# Patient Record
Sex: FEMALE | Race: WHITE
[De-identification: names, ages, dates, MRNs, and addresses within clinical notes are randomized per-mention and may not be internally consistent; named-entity substitution may affect disease eponyms.]

---

## 2017-11-18 ENCOUNTER — HOSPITAL ENCOUNTER (INPATIENT)
Dept: HOSPITAL 25 - ED | Age: 57
LOS: 2 days | Discharge: HOME | DRG: 140 | End: 2017-11-20
Attending: HOSPITALIST | Admitting: INTERNAL MEDICINE
Payer: COMMERCIAL

## 2017-11-18 DIAGNOSIS — Z80.1: ICD-10-CM

## 2017-11-18 DIAGNOSIS — Z88.1: ICD-10-CM

## 2017-11-18 DIAGNOSIS — Z87.01: ICD-10-CM

## 2017-11-18 DIAGNOSIS — G47.33: ICD-10-CM

## 2017-11-18 DIAGNOSIS — E66.01: ICD-10-CM

## 2017-11-18 DIAGNOSIS — G89.29: ICD-10-CM

## 2017-11-18 DIAGNOSIS — Z85.42: ICD-10-CM

## 2017-11-18 DIAGNOSIS — M19.90: ICD-10-CM

## 2017-11-18 DIAGNOSIS — Z87.440: ICD-10-CM

## 2017-11-18 DIAGNOSIS — M54.9: ICD-10-CM

## 2017-11-18 DIAGNOSIS — I10: ICD-10-CM

## 2017-11-18 DIAGNOSIS — F41.9: ICD-10-CM

## 2017-11-18 DIAGNOSIS — G43.909: ICD-10-CM

## 2017-11-18 DIAGNOSIS — Z79.84: ICD-10-CM

## 2017-11-18 DIAGNOSIS — E11.9: ICD-10-CM

## 2017-11-18 DIAGNOSIS — Z90.49: ICD-10-CM

## 2017-11-18 DIAGNOSIS — J44.1: Primary | ICD-10-CM

## 2017-11-18 DIAGNOSIS — E78.5: ICD-10-CM

## 2017-11-18 DIAGNOSIS — E03.9: ICD-10-CM

## 2017-11-18 DIAGNOSIS — Z87.891: ICD-10-CM

## 2017-11-18 DIAGNOSIS — Z90.710: ICD-10-CM

## 2017-11-18 LAB
ALBUMIN SERPL BCG-MCNC: 3.9 G/DL (ref 3.2–5.2)
ALP SERPL-CCNC: 112 U/L (ref 34–104)
ALT SERPL W P-5'-P-CCNC: 19 U/L (ref 7–52)
ANION GAP SERPL CALC-SCNC: 7 MMOL/L (ref 2–11)
AST SERPL-CCNC: 14 U/L (ref 13–39)
BUN SERPL-MCNC: 18 MG/DL (ref 6–24)
BUN/CREAT SERPL: 23.1 (ref 8–20)
CALCIUM SERPL-MCNC: 9.2 MG/DL (ref 8.6–10.3)
CHLORIDE SERPL-SCNC: 100 MMOL/L (ref 101–111)
GLOBULIN SER CALC-MCNC: 3.3 G/DL (ref 2–4)
GLUCOSE SERPL-MCNC: 131 MG/DL (ref 70–100)
HCO3 SERPL-SCNC: 28 MMOL/L (ref 22–32)
HCT VFR BLD AUTO: 40 % (ref 35–47)
HGB BLD-MCNC: 13.7 G/DL (ref 12–16)
MCH RBC QN AUTO: 30 PG (ref 27–31)
MCHC RBC AUTO-ENTMCNC: 34 G/DL (ref 31–36)
MCV RBC AUTO: 89 FL (ref 80–97)
POTASSIUM SERPL-SCNC: 4.1 MMOL/L (ref 3.5–5)
PROT SERPL-MCNC: 7.2 G/DL (ref 6.4–8.9)
RBC # BLD AUTO: 4.51 10^6/UL (ref 4–5.4)
SODIUM SERPL-SCNC: 135 MMOL/L (ref 133–145)
TROPONIN I SERPL-MCNC: 0 NG/ML (ref ?–0.04)
WBC # BLD AUTO: 8.4 10^3/UL (ref 3.5–10.8)

## 2017-11-18 PROCEDURE — 94760 N-INVAS EAR/PLS OXIMETRY 1: CPT

## 2017-11-18 PROCEDURE — 85025 COMPLETE CBC W/AUTO DIFF WBC: CPT

## 2017-11-18 PROCEDURE — 87502 INFLUENZA DNA AMP PROBE: CPT

## 2017-11-18 PROCEDURE — 71010: CPT

## 2017-11-18 PROCEDURE — 87040 BLOOD CULTURE FOR BACTERIA: CPT

## 2017-11-18 PROCEDURE — 84484 ASSAY OF TROPONIN QUANT: CPT

## 2017-11-18 PROCEDURE — 93005 ELECTROCARDIOGRAM TRACING: CPT

## 2017-11-18 PROCEDURE — 80048 BASIC METABOLIC PNL TOTAL CA: CPT

## 2017-11-18 PROCEDURE — 87899 AGENT NOS ASSAY W/OPTIC: CPT

## 2017-11-18 PROCEDURE — 80053 COMPREHEN METABOLIC PANEL: CPT

## 2017-11-18 PROCEDURE — 83880 ASSAY OF NATRIURETIC PEPTIDE: CPT

## 2017-11-18 PROCEDURE — 83605 ASSAY OF LACTIC ACID: CPT

## 2017-11-18 PROCEDURE — 85730 THROMBOPLASTIN TIME PARTIAL: CPT

## 2017-11-18 PROCEDURE — 36415 COLL VENOUS BLD VENIPUNCTURE: CPT

## 2017-11-18 PROCEDURE — 87070 CULTURE OTHR SPECIMN AEROBIC: CPT

## 2017-11-18 PROCEDURE — 94660 CPAP INITIATION&MGMT: CPT

## 2017-11-18 PROCEDURE — 87205 SMEAR GRAM STAIN: CPT

## 2017-11-18 PROCEDURE — 94640 AIRWAY INHALATION TREATMENT: CPT

## 2017-11-18 PROCEDURE — 85610 PROTHROMBIN TIME: CPT

## 2017-11-18 RX ADMIN — HEPARIN SODIUM SCH UNITS: 5000 INJECTION INTRAVENOUS; SUBCUTANEOUS at 20:11

## 2017-11-18 RX ADMIN — PREDNISONE SCH MG: 20 TABLET ORAL at 19:23

## 2017-11-18 RX ADMIN — IPRATROPIUM BROMIDE AND ALBUTEROL SULFATE SCH NEB: .5; 3 SOLUTION RESPIRATORY (INHALATION) at 19:47

## 2017-11-18 RX ADMIN — IPRATROPIUM BROMIDE AND ALBUTEROL SULFATE SCH NEB: .5; 3 SOLUTION RESPIRATORY (INHALATION) at 23:51

## 2017-11-18 RX ADMIN — AZITHROMYCIN SCH MLS/HR: 500 INJECTION, POWDER, LYOPHILIZED, FOR SOLUTION INTRAVENOUS at 19:24

## 2017-11-18 RX ADMIN — ATORVASTATIN CALCIUM SCH: 20 TABLET, FILM COATED ORAL at 19:24

## 2017-11-18 RX ADMIN — MOMETASONE FUROATE AND FORMOTEROL FUMARATE DIHYDRATE SCH PUFF: 200; 5 AEROSOL RESPIRATORY (INHALATION) at 19:47

## 2017-11-18 RX ADMIN — CEFTRIAXONE SODIUM SCH MLS/HR: 1 INJECTION, POWDER, FOR SOLUTION INTRAMUSCULAR; INTRAVENOUS at 21:10

## 2017-11-18 NOTE — RAD
INDICATION: Shortness of breath.



COMPARISON: Most recent comparison chest x-rays dated May 28, 2016

 

TECHNIQUE: Single AP portable view of the chest was obtained.



FINDINGS: 



Image quality is compromised due to the relative inferiority of a portable chest x-ray.



There is a mild degree of cardiomegaly similar in appearance to the prior chest x-ray. The

heart and mediastinum otherwise are normal in contour and morphology.



There is faint density overlying the bilateral lungs. The pulmonary vasculature appears

minimally engorged and indistinct. There is slight bilateral costophrenic angle blunting.



Visualized bones are normal for the patient's age.



IMPRESSION:  In the correct clinical setting chest x-ray findings are most consistent with

exacerbation of congestive heart failure.

## 2017-11-19 LAB
ANION GAP SERPL CALC-SCNC: 8 MMOL/L (ref 2–11)
BUN SERPL-MCNC: 15 MG/DL (ref 6–24)
BUN/CREAT SERPL: 20.8 (ref 8–20)
CALCIUM SERPL-MCNC: 9.5 MG/DL (ref 8.6–10.3)
CHLORIDE SERPL-SCNC: 101 MMOL/L (ref 101–111)
GLUCOSE SERPL-MCNC: 273 MG/DL (ref 70–100)
HCO3 SERPL-SCNC: 26 MMOL/L (ref 22–32)
HCT VFR BLD AUTO: 41 % (ref 35–47)
HGB BLD-MCNC: 13.8 G/DL (ref 12–16)
MCH RBC QN AUTO: 30 PG (ref 27–31)
MCHC RBC AUTO-ENTMCNC: 34 G/DL (ref 31–36)
MCV RBC AUTO: 90 FL (ref 80–97)
POTASSIUM SERPL-SCNC: 4.4 MMOL/L (ref 3.5–5)
RBC # BLD AUTO: 4.54 10^6/UL (ref 4–5.4)
SODIUM SERPL-SCNC: 135 MMOL/L (ref 133–145)
WBC # BLD AUTO: 5 10^3/UL (ref 3.5–10.8)

## 2017-11-19 PROCEDURE — 5A09357 ASSISTANCE WITH RESPIRATORY VENTILATION, LESS THAN 24 CONSECUTIVE HOURS, CONTINUOUS POSITIVE AIRWAY PRESSURE: ICD-10-PCS | Performed by: HOSPITALIST

## 2017-11-19 RX ADMIN — DILTIAZEM HYDROCHLORIDE SCH MG: 240 CAPSULE, COATED, EXTENDED RELEASE ORAL at 07:54

## 2017-11-19 RX ADMIN — LEVOTHYROXINE SODIUM SCH MCG: 175 TABLET ORAL at 07:14

## 2017-11-19 RX ADMIN — INSULIN LISPRO SCH UNITS: 100 INJECTION, SOLUTION INTRAVENOUS; SUBCUTANEOUS at 07:53

## 2017-11-19 RX ADMIN — INSULIN LISPRO SCH UNITS: 100 INJECTION, SOLUTION INTRAVENOUS; SUBCUTANEOUS at 16:24

## 2017-11-19 RX ADMIN — IPRATROPIUM BROMIDE AND ALBUTEROL SULFATE SCH: .5; 3 SOLUTION RESPIRATORY (INHALATION) at 03:16

## 2017-11-19 RX ADMIN — IPRATROPIUM BROMIDE AND ALBUTEROL SULFATE SCH NEB: .5; 3 SOLUTION RESPIRATORY (INHALATION) at 11:30

## 2017-11-19 RX ADMIN — CHOLESTYRAMINE SCH GM: 4 POWDER, FOR SUSPENSION ORAL at 06:15

## 2017-11-19 RX ADMIN — PREDNISONE SCH MG: 20 TABLET ORAL at 07:54

## 2017-11-19 RX ADMIN — IPRATROPIUM BROMIDE AND ALBUTEROL SULFATE SCH NEB: .5; 3 SOLUTION RESPIRATORY (INHALATION) at 16:03

## 2017-11-19 RX ADMIN — CEFTRIAXONE SODIUM SCH MLS/HR: 1 INJECTION, POWDER, FOR SOLUTION INTRAMUSCULAR; INTRAVENOUS at 21:06

## 2017-11-19 RX ADMIN — AZITHROMYCIN SCH MLS/HR: 500 INJECTION, POWDER, LYOPHILIZED, FOR SOLUTION INTRAVENOUS at 16:25

## 2017-11-19 RX ADMIN — MOMETASONE FUROATE AND FORMOTEROL FUMARATE DIHYDRATE SCH PUFF: 200; 5 AEROSOL RESPIRATORY (INHALATION) at 08:32

## 2017-11-19 RX ADMIN — HEPARIN SODIUM SCH: 5000 INJECTION INTRAVENOUS; SUBCUTANEOUS at 12:56

## 2017-11-19 RX ADMIN — IPRATROPIUM BROMIDE AND ALBUTEROL SULFATE SCH NEB: .5; 3 SOLUTION RESPIRATORY (INHALATION) at 23:40

## 2017-11-19 RX ADMIN — ATORVASTATIN CALCIUM SCH MG: 20 TABLET, FILM COATED ORAL at 16:24

## 2017-11-19 RX ADMIN — INSULIN LISPRO SCH UNITS: 100 INJECTION, SOLUTION INTRAVENOUS; SUBCUTANEOUS at 11:41

## 2017-11-19 RX ADMIN — IPRATROPIUM BROMIDE AND ALBUTEROL SULFATE SCH NEB: .5; 3 SOLUTION RESPIRATORY (INHALATION) at 08:30

## 2017-11-19 RX ADMIN — IPRATROPIUM BROMIDE AND ALBUTEROL SULFATE SCH NEB: .5; 3 SOLUTION RESPIRATORY (INHALATION) at 19:25

## 2017-11-19 RX ADMIN — MOMETASONE FUROATE AND FORMOTEROL FUMARATE DIHYDRATE SCH PUFF: 200; 5 AEROSOL RESPIRATORY (INHALATION) at 19:26

## 2017-11-19 RX ADMIN — HEPARIN SODIUM SCH UNITS: 5000 INJECTION INTRAVENOUS; SUBCUTANEOUS at 06:28

## 2017-11-19 RX ADMIN — HEPARIN SODIUM SCH UNITS: 5000 INJECTION INTRAVENOUS; SUBCUTANEOUS at 21:10

## 2017-11-19 NOTE — HP
CC:  Dr. Sushma Hameed *

 

HISTORY AND PHYSICAL:

 

DATE OF ADMISSION:  17

 

PRIMARY CARE PROVIDER:  Dr. Sushma Hameed.

 

ATTENDING PHYSICIAN WHILE IN THE HOSPITAL:  Rajesh Dyson MD * (report 
dictated by Octaviano Solo NP)

 

CHIEF COMPLAINT:

1.  Cough.

2.  Sore throat.

3.  Rhinorrhea.

4.  Shortness of breath.

 

HISTORY OF PRESENT ILLNESS:  Ms. Vazquez is a 57-year-old female patient 
carrying a history of hypothyroidism, chronic back pain,history of pneumonia in 
the past, obesity, ADÁN, COPD, uterine cancer, hyperlipidemia, diabetes, 
hypertension and migraine.  She comes in today stating the last couple of days 
she has been feeling pretty run down.  She has been having difficulty with 
breathing, having a sore throat.  She has progressively become more congested.  
She has had runny nose.  She had been trying to take Mucinex,  unfortunately, 
she ran out of her inhalers.  She tried to work last night, but she could not.  
She went home early from work.  She was lying down in bed.  She just woke up 
from sleep, was really short of breath, was getting more short of breath with 
any type of movement or minimal exertion. She denied having any documented 
fevers or chills and no recent sick contacts that she is aware of.  She does 
state that interestingly she recently had her flu shot. She was concerned 
because of the breathing, she decided to come into the ER today. She says she 
has been having a cough that has been productive.  She is swallowing the sputum
, she is not spitting it out.  She does not know what color it is.  She denied 
having any chest pain with the exception it hurts when she coughs and she 
denied having any nausea, vomiting or diarrhea or any abdominal discomfort.  
She was evaluated here in the ED.  She was given several rounds of nebulizer, 
but despite this she just was not improving.  So, we were asked to evaluate for 
admission.

 

PAST MEDICAL HISTORY:  Significant for:

1.  Hypothyroidism.

2.  Recurrent pneumonia in the past.

3.  Back pain.

4.  Obesity.

5.  ADÁN.

6.  COPD.

7.  Uterine cancer.

8.  Hypertension.

9.  Hyperlipidemia.

10.  Diabetes.

11.  Migraine.

 

PAST SURGICAL HISTORY:

1.  She has had a hernia repair.

2.  D and Cs.

3.  Laparoscopic cholecystectomy.

4.   section x3.

 

MEDICATIONS:  The home meds according to [facility] list and we need that 
clarified because I am not sure what inhaler she is supposed to be taking.  She 
said she has run out and has not been on them for some time, but the most 
recent meds that I have from [facility] include:

 

1.  Diltiazem  mg p.o. daily.

2.  Albuterol 2 puffs inhaled every 6 hours as needed.

3.  Synthroid 175 mcg daily.

4.  Questran 1 packet daily p.o.

5.  Januvia 25 mg daily.

6.  Glucophage 850 mg p.o. b.i.d.

7.  Lipitor 20 mg daily.

 

ALLERGIES TO MEDICATIONS:  Include LEVAQUIN.

 

FAMILY HISTORY:  Mother had a history of a lung cancer.  Father's history was 
reviewed and essentially unremarkable.  She denied her father having heart 
disease, strokes, or cancers.

 

SOCIAL HISTORY:  She is a former smoker, quit 20 years ago.  Does not drink 
alcohol.  Surrogate decision maker is her daughter.

 

REVIEW OF SYSTEMS:  There is no documented fever.  She denied having any 
significant weight change.  There was no double vision.  There is no ear 
discharge. There was rhinorrhea.  There was sore throat.  There was no thyroid 
enlargement. She does admit to having chest per my HPI.  There is dyspnea on 
exertion.  No orthopnea.  No nocturnal dyspnea.  No abdominal pain.  No nausea, 
no vomiting, no dysuria, no frequency.  There was no seizure, no loss of 
consciousness, no pruritus, and no skin ulcerations.  Review of 14 systems 
completed, all others are negative.

 

                               PHYSICAL EXAMINATION

 

GENERAL:  At this time, Ms. Vazquez is a 57-year-old female patient.  She is 
sitting in the ER stretcher.  She does not appear to be in any acute distress.

 

VITAL SIGNS:  Blood pressure 166/70, yesterday pulse 113 and is now 90, 
respirations were 22, O2 sat 98%, and temperature 99.2.

 

HEENT:  Head atraumatic.  Eyes, EOMs intact.  Sclerae anicteric.  Throat, oral 
mucosa appears to be dry.  I did not appreciate any erythema to her throat and 
she again was noted to be hoarse.

 

NECK:  Supple.

 

LUNGS:  She had wheezing noted throughout bilaterally.  Equal diaphragmatic 
expansion.

 

HEART:  Heart sounds S1 and S2.  No murmurs, rubs, or gallops.

 

ABDOMEN:  Soft.  It was flat, nontender.  Bowel sounds present.

 

EXTREMITIES:  Pulses were 2+ throughout.  She is moving all 4 extremities.  5/5 
strength.

 

NEUROLOGIC:  She is awake, alert, and oriented x3.  Speech is clear.  No gross 
focal deficits.

 

SKIN:  Intact.

 

 DIAGNOSTIC STUDIES/LABORATORY DATA:  WBC is 8.4, RBC of 4.51, hemoglobin 13.7, 
hematocrit 40, platelet count 278.  Sodium 135, potassium 4.1, chloride 100, 
bicarb 28, BUN 18, creatinine 0.78, glucose 131, lactate 1.6, calcium 9.2.  
Total bilirubin 1.0, AST 14, ALT 19, alk phos 112.  Troponin 0.  BNP 19.  
Albumin of 3.9. Serology negative for flu.

 

She did have a chest x-ray today.  When I reviewed it, I did not appreciate any 
acute infiltrates.  I reviewed with her previous chest x-ray, to me it appeared 
to be similar.  I did not appreciate any pulmonary edema.  She did have an EKG 
obtained today as well.  Today his EKG is showing a normal sinus rhythm with a 
rate of 94.  No ST elevations or T wave inversions were noted.  Old medical 
records were reviewed.

 

ASSESSMENT AND PLAN:  Ms. Vazquez is 57-year-old female patient coming into the 
ER today with upper respiratory complaints.  The hospitalist service was asked 
to evaluate for admission.  She was not responding to therapy here in the ED.  
She will be admitted under inpatient status for:

 

1.  Chronic obstructive pulmonary disease exacerbation.  At this point, I will 
go ahead and put her on standing neb steroids, inhaled steroids in the form of 
Dulera. At one point, she was on Advair, but she is not taking this anymore.  
She said she ran out.  I will go ahead and get legionella antigen, Strep pneumo 
antigen and I am going to put her on antibiotics as well and we will continue 
to follow her closely. I will order flutter valve as well and try to get a 
sputum culture if possible.

2.  Hypothyroidism.  Continue her Synthroid.

3.  Hyperlipidemia.  Continue her statin and Questran therapy.

4.  Hypertension.  Continue the Cartia.

5.  Obesity and chronic back pain.  Follow up with her primary.  None at this 
point.

6.  Obstructive sleep apnea.  She actually told me she is on BiPAP, we will 
continue.

7.  History of uterine cancer.  Again, follow up with primary.

8.  Migraines.  P.r.n. Tylenol as available.

9.  DVT prophylaxis:  She is high risk, placed on subcu.

10.  Code status:  Full code.

11.  Fluids, electrolytes, and nutrition:  She can have a consistent carb diet.

 

TIME SPENT:  On this admission was approximately 60 minutes, greater than half 
the time spent face-to-face with the patient obtaining my history and physical.
  Other half time spent going over the plan of care with the patient, 
implementing the plan of care.  I discussed the plan of care with my attending 
Dr. Dyson, he is in agreement.

 

 ____________________________________ OCTAVIANO SOLO, ALEXIS

 

[id number] #: [id number]

MTDCAROLYN

## 2017-11-19 NOTE — PN
Subjective


Date of Service: 11/19/17


Interval History: 





Ms. Vazquez reports that she feels better than on arrival but that she remains 

quite dyspneic on exertion.  She denies chest pain, nausea, or abdominal pain.








Objective


Active Medications: 





Acetaminophen (Tylenol Tab*)  650 mg PO Q4H PRN


Albuterol (Ventolin 2.5 Mg/3 Ml Neb.Sol*)  2.5 mg INH Q2H PRN


Albuterol/Ipratropium (Duoneb (Albuterol 2.5 Mg/Ipratropium 0.5 Mg))  1 neb INH 

Q4H MAKENNA


Atorvastatin Calcium (Lipitor*)  20 mg PO 1700 MAKENNA


Cholestyramine Resin (Questran*)  4 gm PO 0400 MAKENNA


Dextrose (D50w Syringe 50 Ml*)  12.5 gm IV PUSH .FOR FS < 60 - SS PRN


Diltiazem HCl (Cardizem Cd Cap*)  120 mg PO DAILY MAKENNA


Heparin Sodium (Porcine) (Heparin Vial(*))  5,000 units SUBCUT Q8HR MAKENNA


Ceftriaxone Sodium 1,000 mg/ (Sodium Chloride)  50 mls @ 200 mls/hr IVPB Q24H 

MAKENNA


Azithromycin 500 mg/ Sodium (Chloride)  250 mls @ 250 mls/hr IVPB Q24H MAKENNA


Insulin Human Lispro (Humalog*)  0 units SUBCUT AC MAKENNA


Levothyroxine Sodium (Synthroid Tab*)  175 mcg PO 0600 MAKENNA


Mometasone Furoate/Formoterol Fumar (Dulera 200/5 Mdi*)  2 puff INH BID MAKENNA


Ondansetron HCl (Zofran Inj*)  4 mg IV Q6H PRN


Prednisone (Deltasone Tab*)  60 mg PO DAILY [facility]





Vital Signs:











Temp Pulse Resp BP Pulse Ox


 


 97.3 F   94   20   161/76   100 


 


 11/19/17 07:58  11/19/17 08:30  11/19/17 08:00  11/19/17 07:58  11/19/17 08:30











Oxygen Devices in Use Now: None


Appearance: Female sitting up in bed in NAD


Eyes: No Scleral Icterus


Ears/Nose/Mouth/Throat: NL Teeth, Lips, Gums


Neck: NL Appearance and Movements; NL JVP


Respiratory: Symmetrical Chest Expansion and Respiratory Effort, - - Rhonchi 

and expiratory wheezing bilaterally


Cardiovascular: NL Sounds; No Murmurs; No JVD, No Edema


Abdominal: NL Sounds; No Tenderness; No Distention


Lymphatic: No Cervical Adenopathy


Extremities: No Edema


Skin: No Rash or Ulcers


Neurological: Alert and Oriented x 3, NL Muscle Strength and Tone


Nutrition: Taking PO's


Result Diagrams: 


 11/19/17 06:22





 11/19/17 06:23


Microbiology and Other Data: 


.





Assess/Plan/Problems-Billing


Assessment: 





Ms. Vazquez is a 58 yo female with a PMH of COPD who was admitted on 11/18/17 

with a COPD exacerbation.








- Patient Problems


(1) COPD exacerbation


Comment: 


- Slow improvement, but not requiring O2 at rest this morning.


- No infiltrate on Cxray, no leukocytosis, afebrile. Suspect COPD exacerbation 

brought on by viral URI.


- Continue prednisone, ceftriazone, azithromycin, dulera, nebulizers, MDI, O2 

prn.   





(2) Hypertension


Comment: 


- -150s.


- Continue diltiazem.   





(3) Diabetes mellitus


Comment: 


-  this AM, likely secondary to high dose steroids on arrival.


- Hold metformin and sitagliptin.  Continue lispro SSI coverage with meals.   





(4) Dyslipidemia


Comment: 


- Continue atorvastatin and cholestyramine.   





(5) Hypothyroid


Comment: 


- TSH 3.25.


- Continue levothyroxine.   





(6) ADÁN (obstructive sleep apnea)


Comment: 


- Continue CPAP/BiPAP qPM.   





(7) DVT prophylaxis


Comment: 


- Heparin SQ.   





(8) Full code status


Comment: 


   


Status and Disposition: 





Inpatient.  Anticipate discharge to home when medically stable.

## 2017-11-19 NOTE — ED
Glory RANDOLPH Thomas, scribed for Ruddy Herzog MD on 11/18/17 at 1517 .





Shortness of Breath





- HPI Summary


HPI Summary: 


This patient is a 57 year old F presenting to [facility] with a chief complaint of 

shortness of breath since yesterday. The patient rates the pain 3/10 in 

severity. Symptoms aggravated by nothing. Symptoms alleviated by nothing. 

Patient reports sore throat, cough, chest pressure, and congestion.





- History of Current Complaint


Chief Complaint: EDShortnessOfBreath


Time Seen by Provider: 11/18/17 14:31


Hx Obtained From: Patient


Onset/Duration: Lasting Days - Since yesterday, Still Present


Dyspnea At: Rest


Aggrevating Factors: Nothing


Alleviating Factors: Nothing


Associated Signs & Symptoms: Cough (Nonproductive)


Related History: Obesity





- Allergy/Home Medications


Allergies/Adverse Reactions: 


 Allergies











Allergy/AdvReac Type Severity Reaction Status Date / Time


 


Levofloxacin [From Levaquin] Allergy Severe Itching Verified 06/08/16 19:36











Home Medications: 


 Home Medications





Atorvastatin* [Lipitor*] 20 mg PO 1700 11/18/17 [History Confirmed 11/18/17]


Cholestyramine Resin* [Questran*] 1 packet PO DAILY 11/18/17 [History Confirmed 

11/18/17]


Diltiazem HCl Coated Beads [Cartia Xt] 120 mg PO DAILY 11/18/17 [History 

Confirmed 11/18/17]


Metformin HCl [Glucophage] 850 mg PO BID 11/18/17 [History Confirmed 11/18/17]


Sitagliptin Phosphate [Januvia] 25 mg PO DAILY 11/18/17 [History Confirmed 11/18 /17]











PMH/Surg Hx/FS Hx/Imm Hx


Previously Healthy: No


Endocrine/Hematology History: Reports: Hx Diabetes - glucose typically runs 80'

s - 130's, Hx Thyroid Disease - hypo, on Synthroid


Cardiovascular History: Reports: Hx Hypercholesterolemia, Other Cardiovascular 

Problems/Disorders - increased heart rate 2 x in her life


   Denies: Hx Deep Vein Thrombosis, Hx Embolism, Hx Hypertension, Hx Myocardial 

Infarction, Hx Pacemaker/ICD


Respiratory History: Reports: Hx Asthma - well controlled, Hx Chronic 

Obstructive Pulmonary Disease (COPD), Hx Pneumonia, Hx Sleep Apnea


GI History: Reports: Hx Gall Bladder Disease - cholecystectomy 27 yrs ago, 

Other GI Disorders - diarrhea w/certain foods (ie. chips)


   Denies: Hx Crohn's Disease


 History: Reports: Other  Problems/Disorders - Frequent UTI's


Musculoskeletal History: Reports: Hx Arthritis - joints, not in back


   Denies: Hx Back Problems


Sensory History: Reports: Hx Contacts or Glasses


   Denies: Hx Hearing Aid


Opthamlomology History: Reports: Hx Contacts or Glasses


Neurological History: Reports: Hx Migraine


Psychiatric History: Reports: Hx Anxiety


   Denies: Hx Panic Disorder





- Cancer History


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


Hx Chemotherapy: No





- Surgical History


Surgery Procedure, Year, and Place: 3-C SECTION ;  D&C; CYST-COCCYX REMOVED; 

GALLBLADDER- 24 YRS AGO,HYSTERECTOMY





- Immunization History


Date of Tetanus Vaccine: >10 years


Date of Influenza Vaccine: fall 2013


Infectious Disease History: No


Infectious Disease History: 


   Denies: Hx Shingles, Traveled Outside the [place] in Last 30 Days





- Family History


Known Family History: Positive: Other - Cancer


   Negative: Seizure Disorder





- Social History


Alcohol Use: None


Hx Substance Use: No


Substance Use Type: Reports: None


Hx Tobacco Use: No


Smoking Status (MU): Former Smoker


Type: Cigarettes


Have You Smoked in the Last Year: No





Review of Systems


Negative: Fever


ENT: Other - Congestion


Positive: Sore Throat


Respiratory: Other - Chest pressure


Positive: Shortness Of Breath, Cough


All Other Systems Reviewed And Are Negative: Yes





Physical Exam





- Summary


Physical Exam Summary: 


Appearance: The patient is well-nourished in no acute distress and in no acute 

pain. The patient whispers rather than talk.





Skin: The skin is warm and dry and skin color reflects adequate perfusion.





HEENT:  The head is normocephalic and atraumatic. The pupils are equal and 

reactive. The conjunctivae are clear and without drainage.  Nares are patent 

and without drainage.  Mouth reveals moist mucous membranes and the throat is 

without erythema and exudate.  The external ears are intact. The ear canals are 

patent and without drainage. The tympanic membranes are intact.





Neck: the neck is supple with full range of motion and non-tender. There are no 

carotid bruits.  There is no neck vein distension.





Respiratory: Chest is non-tender.  Patient has expiratory wheezing with 

coughing. She is tachypneic.





Cardiovascular: Heart is regular rhythm and tachycardic. There is no murmur or 

rub auscultated.   There is no peripheral edema and pulses are symmetrical and 

equal.





Abdomen: The abdomen is soft and non-tender.  There are normal bowel sounds 

heard in all four quadrants and there is no organomegaly palpated.





Musculoskeletal: There is no back tenderness noted.  Extremities are non-tender 

with full range of motion.  There is good capillary refill.  There is no 

peripheral edema or calf tenderness elicited.





Neurological: Patient is alert and oriented to person, place and time.  The 

patient has symmetrical motor strength in all four extremities.  Cranial nerves 

are grossly intact. Deep tendon reflexes are symmetrical and equal in all four 

extremities.





Psychiatric: The patient has an appropriate affect and does not exhibit any 

anxiety or depression.


Triage Information Reviewed: Yes


Vital Signs On Initial Exam: 


 Initial Vitals











Temp Pulse Resp BP Pulse Ox


 


 99.2 F   113   24   166/70   91 


 


 11/18/17 14:27  11/18/17 14:27  11/18/17 14:27  11/18/17 14:27  11/18/17 14:27











Vital Signs Reviewed: Yes





Diagnostics





- Vital Signs


 Vital Signs











  Temp Pulse Resp BP Pulse Ox


 


 11/18/17 14:27  99.2 F  113  24  166/70  91














- Laboratory


Lab Results: 


 Lab Results











  11/18/17 11/18/17 11/18/17 Range/Units





  14:51 14:51 14:51 


 


WBC   8.4   (3.5-10.8)  10^3/ul


 


RBC   4.51   (4.0-5.4)  10^6/ul


 


Hgb   13.7   (12.0-16.0)  g/dl


 


Hct   40   (35-47)  %


 


MCV   89   (80-97)  fL


 


MCH   30   (27-31)  pg


 


MCHC   34   (31-36)  g/dl


 


RDW   14   (10.5-15)  %


 


Plt Count   278   (150-450)  10^3/ul


 


MPV   7 L   (7.4-10.4)  um3


 


Neut % (Auto)   73.4   (38-83)  %


 


Lymph % (Auto)   14.5 L   (25-47)  %


 


Mono % (Auto)   10.0 H   (1-9)  %


 


Eos % (Auto)   1.4   (0-6)  %


 


Baso % (Auto)   0.7   (0-2)  %


 


Absolute Neuts (auto)   6.2   (1.5-7.7)  10^3/ul


 


Absolute Lymphs (auto)   1.2   (1.0-4.8)  10^3/ul


 


Absolute Monos (auto)   0.8   (0-0.8)  10^3/ul


 


Absolute Eos (auto)   0.1   (0-0.6)  10^3/ul


 


Absolute Basos (auto)   0.1   (0-0.2)  10^3/ul


 


Absolute Nucleated RBC   0.01   10^3/ul


 


Nucleated RBC %   0.1   


 


INR (Anticoag Therapy)     (0.89-1.11)  


 


APTT     (26.0-36.3)  seconds


 


Sodium    135  (133-145)  mmol/L


 


Potassium    4.1  (3.5-5.0)  mmol/L


 


Chloride    100 L  (101-111)  mmol/L


 


Carbon Dioxide    28  (22-32)  mmol/L


 


Anion Gap    7  (2-11)  mmol/L


 


BUN    18  (6-24)  mg/dL


 


Creatinine    0.78  (0.51-0.95)  mg/dL


 


Est GFR ( Amer)    97.2  (>60)  


 


Est GFR (Non-Af Amer)    75.6  (>60)  


 


BUN/Creatinine Ratio    23.1 H  (8-20)  


 


Glucose    131 H  ()  mg/dL


 


Lactic Acid     (0.5-2.0)  mmol/L


 


Calcium    9.2  (8.6-10.3)  mg/dL


 


Total Bilirubin    1.00  (0.2-1.0)  mg/dL


 


AST    14  (13-39)  U/L


 


ALT    19  (7-52)  U/L


 


Alkaline Phosphatase    112 H  ()  U/L


 


Troponin I    0.00  (<0.04)  ng/mL


 


B-Natriuretic Peptide  19   ( - 100) pg/mL


 


Total Protein    7.2  (6.4-8.9)  g/dL


 


Albumin    3.9  (3.2-5.2)  g/dL


 


Globulin    3.3  (2-4)  g/dL


 


Albumin/Globulin Ratio    1.2  (1-3)  


 


Influenza A (Rapid)     (Negative)  


 


Influenza B (Rapid)     (Negative)  














  11/18/17 11/18/17 11/18/17 Range/Units





  14:51 14:51 15:51 


 


WBC     (3.5-10.8)  10^3/ul


 


RBC     (4.0-5.4)  10^6/ul


 


Hgb     (12.0-16.0)  g/dl


 


Hct     (35-47)  %


 


MCV     (80-97)  fL


 


MCH     (27-31)  pg


 


MCHC     (31-36)  g/dl


 


RDW     (10.5-15)  %


 


Plt Count     (150-450)  10^3/ul


 


MPV     (7.4-10.4)  um3


 


Neut % (Auto)     (38-83)  %


 


Lymph % (Auto)     (25-47)  %


 


Mono % (Auto)     (1-9)  %


 


Eos % (Auto)     (0-6)  %


 


Baso % (Auto)     (0-2)  %


 


Absolute Neuts (auto)     (1.5-7.7)  10^3/ul


 


Absolute Lymphs (auto)     (1.0-4.8)  10^3/ul


 


Absolute Monos (auto)     (0-0.8)  10^3/ul


 


Absolute Eos (auto)     (0-0.6)  10^3/ul


 


Absolute Basos (auto)     (0-0.2)  10^3/ul


 


Absolute Nucleated RBC     10^3/ul


 


Nucleated RBC %     


 


INR (Anticoag Therapy)   0.87 L   (0.89-1.11)  


 


APTT   32.4   (26.0-36.3)  seconds


 


Sodium     (133-145)  mmol/L


 


Potassium     (3.5-5.0)  mmol/L


 


Chloride     (101-111)  mmol/L


 


Carbon Dioxide     (22-32)  mmol/L


 


Anion Gap     (2-11)  mmol/L


 


BUN     (6-24)  mg/dL


 


Creatinine     (0.51-0.95)  mg/dL


 


Est GFR ( Amer)     (>60)  


 


Est GFR (Non-Af Amer)     (>60)  


 


BUN/Creatinine Ratio     (8-20)  


 


Glucose     ()  mg/dL


 


Lactic Acid  1.6    (0.5-2.0)  mmol/L


 


Calcium     (8.6-10.3)  mg/dL


 


Total Bilirubin     (0.2-1.0)  mg/dL


 


AST     (13-39)  U/L


 


ALT     (7-52)  U/L


 


Alkaline Phosphatase     ()  U/L


 


Troponin I     (<0.04)  ng/mL


 


B-Natriuretic Peptide    ( - 100) pg/mL


 


Total Protein     (6.4-8.9)  g/dL


 


Albumin     (3.2-5.2)  g/dL


 


Globulin     (2-4)  g/dL


 


Albumin/Globulin Ratio     (1-3)  


 


Influenza A (Rapid)    Negative  (Negative)  


 


Influenza B (Rapid)    Negative  (Negative)  











Result Diagrams: 


 11/19/17 06:22





 11/19/17 06:23


Lab Statement: Any lab studies that have been ordered have been reviewed, and 

results considered in the medical decision making process.





- Radiology


  ** CXR


Xray Interpretation: Positive (See Comments) - In the correct clinical setting 

chest x-ray findings are most consistent with exacerbation of congestive heart 

failure. ED physician has reviewed this report and agrees.


Radiology Interpretation Completed By: Radiologist





- EKG


  ** 14:48


Cardiac Rate: NL


EKG Rhythm: Sinus Rhythm


EKG Interpretation: 94 BPM. Normal EKG. 





Re-Evaluation





- Re-Evaluation


  ** First Eval


Re-Evaluation Time: 16:24


Change: Unchanged


Comment: The patient still has expiratory wheezing with cough. She is still 

tachypneic.





Course/Dx





- Course


Course Of Treatment: Ms. Vazquez has been fighting an exacerbation of her asthma 

for a few days.  She is out of her inhaler and has URI symptoms. She didn't 

really get any better with treatment here in the ED and is being admitted to 

the hospital by the hospitalists.





- Diagnoses


Provider Diagnoses: 


 Asthma exacerbation








- Critical Care Time


Critical Care Time: 30-74 min





Discharge





- Discharge Plan


Condition: Stable


Disposition: ADMITTED TO [facility] documentation as recorded by the Glory swenson Thomas accurately reflects 

the service I personally performed and the decisions made by me, Ruddy Herzog MD.

## 2017-11-19 NOTE — PN
Subjective


Date of Service: 11/19/17





Objective


Active Medications: 





Acetaminophen (Tylenol Tab*)  650 mg PO Q4H PRN


Albuterol (Ventolin 2.5 Mg/3 Ml Neb.Sol*)  2.5 mg INH Q2H PRN


Albuterol/Ipratropium (Duoneb (Albuterol 2.5 Mg/Ipratropium 0.5 Mg))  1 neb INH 

Q4H MAKENNA


Atorvastatin Calcium (Lipitor*)  20 mg PO 1700 MAKENNA


Cholestyramine Resin (Questran*)  4 gm PO 0400 MAKENNA


Dextrose (D50w Syringe 50 Ml*)  12.5 gm IV PUSH .FOR FS < 60 - SS PRN


Diltiazem HCl (Cardizem Cd Cap*)  120 mg PO DAILY MAKENNA


Heparin Sodium (Porcine) (Heparin Vial(*))  5,000 units SUBCUT Q8HR MAKENNA


Ceftriaxone Sodium 1,000 mg/ (Sodium Chloride)  50 mls @ 200 mls/hr IVPB Q24H 

MAKENNA


Azithromycin 500 mg/ Sodium (Chloride)  250 mls @ 250 mls/hr IVPB Q24H MAKENNA


Insulin Human Lispro (Humalog*)  0 units SUBCUT AC MAKENNA


Levothyroxine Sodium (Synthroid Tab*)  175 mcg PO 0600 MAKENNA


Mometasone Furoate/Formoterol Fumar (Dulera 200/5 Mdi*)  2 puff INH BID MAKENNA


Ondansetron HCl (Zofran Inj*)  4 mg IV Q6H PRN


Prednisone (Deltasone Tab*)  60 mg PO DAILY [facility]





Vital Signs:











Temp Pulse Resp BP Pulse Ox


 


 97.7 F   92   20   148/63   100 


 


 11/18/17 23:25  11/19/17 03:29  11/19/17 03:29  11/19/17 03:29  11/19/17 03:29











Oxygen Devices in Use Now: Nasal Cannula


Result Diagrams: 


 11/19/17 06:22





 11/19/17 06:23


Microbiology and Other Data: 


.





Assess/Plan/Problems-Billing


Assessment: 





Ms. Vazquez is a 58 yo female with a PMH of COPD who was admitted on 11/18/17 

with a COPD exacerbation.








- Patient Problems


(1) COPD exacerbation


Comment: 


- No infiltrate on Cxray, no leukocytosis, afebrile.


- Continue prednisone, ceftriazone, azithromycin, dulera, nebulizers, MDI, O2 

prn.   





(2) Hypertension


Comment: 


- -150s.


- Continue diltiazem   





(3) Diabetes mellitus


Comment: 


-  this AM, likely secondary to high dose steroids on arrival.


- Continue lispro SSI coverage with meals.   





(4) Dyslipidemia


Comment: 


- Continue atorvastatin and cholestyramine.   





(5) Hypothyroid


Comment: 


- TSH 3.25.


- Continue levothyroxine.   





(6) ADÁN (obstructive sleep apnea)


Comment: 


- Continue CPAP/BiPAP qPM.   





(7) DVT prophylaxis








(8) Full code status


Comment: 


- Heparin SQ.   


Status and Disposition: 





Inpatient.  Anticipate discharge to home when medically stable.

## 2017-11-20 VITALS — SYSTOLIC BLOOD PRESSURE: 146 MMHG | DIASTOLIC BLOOD PRESSURE: 68 MMHG

## 2017-11-20 RX ADMIN — PREDNISONE SCH MG: 20 TABLET ORAL at 08:46

## 2017-11-20 RX ADMIN — IPRATROPIUM BROMIDE AND ALBUTEROL SULFATE SCH: .5; 3 SOLUTION RESPIRATORY (INHALATION) at 03:46

## 2017-11-20 RX ADMIN — DILTIAZEM HYDROCHLORIDE SCH MG: 240 CAPSULE, COATED, EXTENDED RELEASE ORAL at 08:48

## 2017-11-20 RX ADMIN — DILTIAZEM HYDROCHLORIDE SCH: 240 CAPSULE, COATED, EXTENDED RELEASE ORAL at 08:47

## 2017-11-20 RX ADMIN — IPRATROPIUM BROMIDE AND ALBUTEROL SULFATE SCH NEB: .5; 3 SOLUTION RESPIRATORY (INHALATION) at 08:08

## 2017-11-20 RX ADMIN — INSULIN LISPRO SCH UNITS: 100 INJECTION, SOLUTION INTRAVENOUS; SUBCUTANEOUS at 07:28

## 2017-11-20 RX ADMIN — MOMETASONE FUROATE AND FORMOTEROL FUMARATE DIHYDRATE SCH PUFF: 200; 5 AEROSOL RESPIRATORY (INHALATION) at 08:08

## 2017-11-20 RX ADMIN — HEPARIN SODIUM SCH UNITS: 5000 INJECTION INTRAVENOUS; SUBCUTANEOUS at 04:55

## 2017-11-20 RX ADMIN — INSULIN LISPRO SCH UNITS: 100 INJECTION, SOLUTION INTRAVENOUS; SUBCUTANEOUS at 11:40

## 2017-11-20 RX ADMIN — CHOLESTYRAMINE SCH GM: 4 POWDER, FOR SUSPENSION ORAL at 04:54

## 2017-11-20 RX ADMIN — LEVOTHYROXINE SODIUM SCH MCG: 175 TABLET ORAL at 05:44

## 2017-11-21 NOTE — DS
CC:  Dr. Hameed *

 

DISCHARGE SUMMARY:

 

DATE OF ADMISSION:  11/18/17.

 

DATE OF DISCHARGE:  11/20/17.

 

DISCHARGING PROVIDER:  NICOLASA Campuzano.

 

SUPERVISING PHYSICIAN:  Dr. Vic Woo * (DICTATED BY NICOLASA CAMPUZANO)

 

PRIMARY CARE PROVIDER:  Dr. Hameed.

 

PRIMARY DISCHARGE DIAGNOSIS:  Chronic obstructive pulmonary disease 
exacerbation.

 

SECONDARY DISCHARGE DIAGNOSES:

1.  Hypertension.

2.  Diabetes, noninsulin dependent.

3.  Hyperlipidemia.

4.  Hypothyroidism.

5.  Obstructive sleep apnea.

6.  Morbid obesity with a BMI of 62.

 

DISCHARGE MEDICATIONS:

1.  Albuterol HFA 2 puffs inhaled q.6 hours as needed for shortness of breath.

2.  DuoNeb 1 neb inhaled 4 times daily as needed for shortness of breath.

3.  Atorvastatin 20 mg p.o. daily.

4.  Cholestyramine 1 packet p.o. daily.

5.  Diltiazem 120 mg p.o. daily.

6.  Levothyroxine 175 mcg p.o. daily.

7.  Metformin 850 mg p.o. twice daily.

8.  Prednisone on a tapering dose of 40 mg x2 days followed by 20 mg x2 days 
and then stop.

9.  Januvia 25 mg p.o. daily.

 

Medication changes: 

1.  Prednisone at a tapering dose.

 

DIAGNOSTIC DATA:  Chest x-ray:  Possible pulmonary edema, but limited exam.

 

HOSPITAL COURSE:  This is a 57-year-old morbidly obese female with COPD, 
hypertension, hyperlipidemia, obstructive sleep apnea, and noninsulin dependent 
diabetes as well as hypothyroidism who presented to the emergency department 
with complaints of shortness of breath.  The patient started to have some mild 
viral symptoms including a sore throat and rhinorrhea and then became acutely 
short of breath.  Her initial chest x-ray did not demonstrate an acute 
infiltrate.  She was tachycardic and tachypneic at the time of admission 
without severe hypoxia.  She had significant wheeze on initial exam, 
subsequently admitted for COPD exacerbation.

 

Patient was treated with systemic corticosteroids, inhaled steroids/long-acting 
beta agonists and frequent DuoNeb to a positive effect.  Patient remained hoarse
, but her dyspnea improved as did her lung exam with maintaining decent oxygen 
saturation on room air.

 

Of note, patient states that she stopped her maintenance inhaler quite sometime 
ago.  She was previously prescribed Advair, but had difficulty affording the 
medication.  She was not clear exactly how expensive the medication was, 
whether it was simply not a covered medication.  I suggested to her that the 
severity and infrequency of her exacerbation would decrease if she is able to 
stay on a maintenance inhaler.  I encouraged her to discuss this further with 
her primary care provider.

 

DISPOSITION AND FOLLOW-UP PLAN:  The patient is being discharged to home with 
the above medications.  She will require close followup with her primary care 
provider and again to discuss use of maintenance inhaler.

 

____________________________________ NICOLASA CAMPUZANO

 

[id number]/CPS #: [id number]

NISA

## 2017-12-31 ENCOUNTER — HOSPITAL ENCOUNTER (EMERGENCY)
Dept: HOSPITAL 25 - ED | Age: 57
Discharge: HOME | End: 2017-12-31
Payer: COMMERCIAL

## 2017-12-31 ENCOUNTER — HOSPITAL ENCOUNTER (EMERGENCY)
Dept: HOSPITAL 25 - UCEAST | Age: 57
Discharge: ANOTHER HOSPITAL | End: 2017-12-31
Payer: COMMERCIAL

## 2017-12-31 VITALS — SYSTOLIC BLOOD PRESSURE: 175 MMHG | DIASTOLIC BLOOD PRESSURE: 61 MMHG

## 2017-12-31 VITALS — SYSTOLIC BLOOD PRESSURE: 183 MMHG | DIASTOLIC BLOOD PRESSURE: 79 MMHG
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
## 2017-12-31 DIAGNOSIS — Z87.891: ICD-10-CM
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
## 2017-12-31 DIAGNOSIS — M54.2: ICD-10-CM

## 2017-12-31 DIAGNOSIS — R51: ICD-10-CM

## 2017-12-31 DIAGNOSIS — R11.0: ICD-10-CM

## 2017-12-31 DIAGNOSIS — M54.5: ICD-10-CM

## 2017-12-31 DIAGNOSIS — M25.551: ICD-10-CM
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
## 2017-12-31 DIAGNOSIS — S39.012A: Primary | ICD-10-CM

## 2017-12-31 DIAGNOSIS — S09.90XA: Primary | ICD-10-CM

## 2017-12-31 DIAGNOSIS — Z88.1: ICD-10-CM

## 2017-12-31 PROCEDURE — 72100 X-RAY EXAM L-S SPINE 2/3 VWS: CPT

## 2017-12-31 PROCEDURE — 72125 CT NECK SPINE W/O DYE: CPT

## 2017-12-31 PROCEDURE — 99213 OFFICE O/P EST LOW 20 MIN: CPT

## 2017-12-31 PROCEDURE — 99282 EMERGENCY DEPT VISIT SF MDM: CPT

## 2017-12-31 PROCEDURE — G0463 HOSPITAL OUTPT CLINIC VISIT: HCPCS

## 2017-12-31 PROCEDURE — 70450 CT HEAD/BRAIN W/O DYE: CPT
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
## 2017-12-31 NOTE — RAD
INDICATION: Right hip pain after a fall



COMPARISON: CT of the pelvis December 04, 2017

 

TECHNIQUE: 4 views of the right hip were obtained.



FINDINGS: 



The visualized bones of the right hip are well-corticated and properly aligned. The joint

spaces are normal. There is no radiographic evidence of acute fracture or dislocation.



IMPRESSION:  Normal radiograph of the right hip. 



If the patient's symptoms persist follow-up imaging is recommended.

## 2017-12-31 NOTE — UC
Head Injury HPI





- HPI Summary


HPI Summary: 





57 year old female presents with complains of nausea and headache post severe 

neck and head injury. I will send her to the er. 





- History Of Current Complaint


Stated Complaint: FELL-HEAD,NECK AND LEG PAIN


Time Seen by Provider: 12/31/17 08:50


Hx Obtained From: Patient


Pregnant?: No


Onset/Duration: Sudden Onset


Severity Currently: Moderate


Severity Initially: Moderate


Pain Scale Used: 0-10 Numeric - 5





- Allergies/Home Medications


Allergies/Adverse Reactions: 


 Allergies











Allergy/AdvReac Type Severity Reaction Status Date / Time


 


Levofloxacin [From Levaquin] Allergy Severe Itching Verified 12/31/17 09:09














PMH/Surg Hx/FS Hx/Imm Hx


Previously Healthy: Yes





- Surgical History


Surgical History: Yes


Surgery Procedure, Year, and Place: 3-C SECTION ;  D&C; CYST-COCCYX REMOVED; 

GALLBLADDER- 24 YRS AGO,HYSTERECTOMY





- Family History


Known Family History: Positive: Unknown, Other - Cancer


   Negative: Seizure Disorder





- Social History


Alcohol Use: None


Substance Use Type: None


Smoking Status (MU): Former Smoker


Type: Cigarettes


Have You Smoked in the Last Year: No


Household Exposure Type: Cigarettes





- Immunization History


Most Recent Influenza Vaccination: 9/2014


Most Recent Tetanus Shot: 2014


Most Recent Pneumonia Vaccination: 9/2014





Review of Systems


Constitutional: Negative


Skin: Negative


Eyes: Negative


ENT: Negative


Respiratory: Negative


Cardiovascular: Negative


Gastrointestinal: Negative


Genitourinary: Negative


Motor: Negative


Neurovascular: Negative


Musculoskeletal: Arthralgia, Myalgia, Other: - head/neck pain


Neurological: Negative


Psychological: Negative


All Other Systems Reviewed And Are Negative: Yes





Physical Exam


Triage Information Reviewed: Yes


Vital Signs Reviewed: Yes


Eye Exam: Normal


ENT Exam: Normal


Dental Exam: Normal


Neck exam: Normal


Neck: Positive: 1


Respiratory Exam: Normal


Cardiovascular Exam: Normal


Abdominal Exam: Normal


Musculoskeletal Exam: Normal


Neurological Exam: Normal


Psychological Exam: Normal


Skin Exam: Normal





Head Injury Course/Dx





- Differential Dx/Diagnosis


Provider Diagnoses: head inujury.  neck pain.  headache.  nausea





Discharge





- Discharge Plan


Condition: Stable


Disposition: TRANS [facility] OF CARE FAC


Referrals: 


Sushma Hameed MD [Primary Care Provider] - 


Additional Instructions:

## 2017-12-31 NOTE — RAD
indication:  Trauma.



COMPARISON:  None



A CT scan of the brain and c-spine was performed without intravenous contrast enhancement.

 Contiguous axial sections were obtained from the lung apices through the vertex.



BRAIN:



The ventricles, cisterns and sulci are within normal limits.  No significant focal

abnormality or mass effect is seen.  The grey-white differentiation is adequately

maintained. 



There is no evidence for intracranial hemorrhage.



Incidental note is made of hyperostosis frontalis interna. No significant bony abnormality

is present. 



The mastoid air cells are appropriately aerated. 



The visualized paranasal sinuses are clear. 



C-SPINE:



There is nonspecific straightening of the normal cervical lordosis on the sagittal view

images. Multilevel degenerative changes of the cervical spine include loss of

intervertebral disc height and marginal osteophyte formation. This is more severe at the

lower cervical spine from C5 to T1. Uncovertebral hypertrophy with sclerotic change of the

articulating surfaces is evident on the coronal plane images. 



There is no hyperdense material in the cervical canal to indicate hemorrhage. The

visualized musculature and soft tissues are normal. There is no gross lymphadenopathy

visualized.



Coarse calcification is noted at the bilateral carotid bulbs.



The visualized portion of the lung apices are clear.



IMPRESSION:  

1. No calvarial fracture or acute intracranial hemorrhage.

2. There is nonspecific straightening of the normal cervical lordosis and multilevel

degenerative change of the cervical spine without acute fracture or dislocation.

## 2017-12-31 NOTE — RAD
INDICATION:  Low back pain after a fall



COMPARISON: CT examination dated May 28, 2016



TECHNIQUE: 2 views of the lumbar spine were obtained.



FINDINGS: The vertebra are in normal alignment. No fracture is seen. Degenerative changes

of the lumbar spine include loss of intervertebral disc height involving the lower

thoracic and lumbar spine with mild marginal osteophyte patient.



IMPRESSION: 

 Degenerative changes similar to the prior CT examination without radiographically

apparent fracture or dislocation.